# Patient Record
Sex: MALE
[De-identification: names, ages, dates, MRNs, and addresses within clinical notes are randomized per-mention and may not be internally consistent; named-entity substitution may affect disease eponyms.]

---

## 2019-01-19 ENCOUNTER — TRANSCRIPTION ENCOUNTER (OUTPATIENT)
Age: 44
End: 2019-01-19

## 2019-02-15 ENCOUNTER — TRANSCRIPTION ENCOUNTER (OUTPATIENT)
Age: 44
End: 2019-02-15

## 2019-07-07 ENCOUNTER — TRANSCRIPTION ENCOUNTER (OUTPATIENT)
Age: 44
End: 2019-07-07

## 2019-09-11 ENCOUNTER — OTHER (OUTPATIENT)
Age: 44
End: 2019-09-11

## 2019-09-11 ENCOUNTER — APPOINTMENT (OUTPATIENT)
Dept: ORTHOPEDIC SURGERY | Facility: CLINIC | Age: 44
End: 2019-09-11
Payer: MEDICAID

## 2019-09-11 VITALS
HEART RATE: 51 BPM | WEIGHT: 210 LBS | SYSTOLIC BLOOD PRESSURE: 136 MMHG | HEIGHT: 69 IN | DIASTOLIC BLOOD PRESSURE: 91 MMHG | BODY MASS INDEX: 31.1 KG/M2

## 2019-09-11 DIAGNOSIS — M54.41 LUMBAGO WITH SCIATICA, RIGHT SIDE: ICD-10-CM

## 2019-09-11 DIAGNOSIS — Z78.9 OTHER SPECIFIED HEALTH STATUS: ICD-10-CM

## 2019-09-11 PROCEDURE — 99204 OFFICE O/P NEW MOD 45 MIN: CPT

## 2019-09-11 PROCEDURE — 72100 X-RAY EXAM L-S SPINE 2/3 VWS: CPT

## 2019-09-11 PROCEDURE — 99203 OFFICE O/P NEW LOW 30 MIN: CPT

## 2019-09-11 RX ORDER — METHYLPREDNISOLONE 4 MG/1
4 TABLET ORAL
Qty: 1 | Refills: 0 | Status: ACTIVE | COMMUNITY
Start: 2019-09-11 | End: 1900-01-01

## 2019-09-11 NOTE — DISCUSSION/SUMMARY
[de-identified] : Continuation of Medrol Dosepak anti-inflammatories. Based upon his failed medication and significant complaints of sciatica patient is going to have a lumbar MRI obtained that further diagnostics prepared such as an epidural injection etc. His MRI seems amenable to injection therapy patient be referred to physiatry

## 2019-09-11 NOTE — PHYSICAL EXAM
[de-identified] : CONSTITUTIONAL: The patient is a very pleasant individual who is well-nourished and who appears stated age.\par PSYCHIATRIC: The patient is alert and oriented X 3 and in no apparent distress, and participates with orthopedic evaluation well.\par HEAD: Atraumatic and is nonsyndromic in appearance.\par EENT: No visible thyromegaly, EOMI.\par RESPIRATORY: Respiratory rate is regular, not dyspneic on examination.\par LYMPHATICS: There is no inguinal lymphadenopathy\par INTEGUMENTARY: Skin is clean, dry, and intact about the bilateral lower extremities and lumbar spine.\par VASCULAR: There is brisk capillary refill about the bilateral lower extremities.\par NEUROLOGIC: There are no pathologic reflexes. There is a decrease in sensation of the bilateral lower extremities on manual  examination. Deep tendon reflexes are well maintained at 2+/4 of the bilateral lower extremities and are symmetric..\par MUSCULOSKELETAL: There is no visible muscular atrophy. Manual motor strength is well maintained in the bilateral lower extremities. Range of motion of lumbar spine is well maintained but painful. The patient ambulates in a non-myelopathic manner. Negative tension sign and straight leg raise bilaterally. Quad extension, ankle dorsiflexion, EHL, plantar flexion, and ankle eversion are well preserved. Normal secondary orthopaedic exam of bilateral hips, greater trochanteric area, knees and ankles. There is significant pain with palpation and significant apprehensive range of motion.\par  [de-identified] : X-rays reviewed of the lumbar spine on today's date that shows age-appropriate lumbar degenerative disc disease

## 2019-09-11 NOTE — HISTORY OF PRESENT ILLNESS
[Worsening] : worsening [___ wks] : [unfilled] week(s) ago [10] : a current pain level of 10/10 [Lifting] : worsened by lifting [Bending] : worsened by bending [None] : No relieving factors are noted [de-identified] : Pleasant 44-year-old gentleman who presents in followup to an urgent care visit from last week. Patient states has a significant amount of low-back pain as well as right sciatica. Patient states medication so far been completely ineffective at controlling his pain and no discomfort he has failed anti-inflammatories muscle relaxants and a short course of steroids. ODILIA questionnaire is negative [Incontinence] : no incontinence [Ataxia] : no ataxia [Loss of Dexterity] : good dexterity [Urinary Ret.] : no urinary retention

## 2019-10-02 ENCOUNTER — APPOINTMENT (OUTPATIENT)
Dept: ORTHOPEDIC SURGERY | Facility: CLINIC | Age: 44
End: 2019-10-02
Payer: MEDICAID

## 2019-10-02 VITALS
BODY MASS INDEX: 31.83 KG/M2 | SYSTOLIC BLOOD PRESSURE: 129 MMHG | WEIGHT: 210 LBS | HEART RATE: 56 BPM | HEIGHT: 68 IN | DIASTOLIC BLOOD PRESSURE: 83 MMHG

## 2019-10-02 PROCEDURE — 99214 OFFICE O/P EST MOD 30 MIN: CPT

## 2019-10-02 NOTE — DISCUSSION/SUMMARY
[de-identified] : 45yo M with lumbar Degenerative disc disease and Annular tears. Pt was encouraged to start conservative management with physical therapy for core strengthening modalities, decreased pain modalities and medical massage. he was referred to physiatry for consideration of injection therapy.He was recommended continue with anti-inflammatories over-the-counter ibuprofen 3 tablets q.6 to 8 hours as needed for pain. He will followup as needed.

## 2019-10-02 NOTE — HISTORY OF PRESENT ILLNESS
[de-identified] : 43yo M presents for MRI review.  He reports back pain is still persistent across the lumbar spine the right lower extremity. He states Medrol Dosepak did not provide improvement of his pain he has not started physical therapy. He is currently not taking any anti-inflammatories he states it has not provided any improvement.    [Stable] : stable [Pain Location] : pain [10] : a current pain level of 10/10

## 2019-10-02 NOTE — PHYSICAL EXAM
[de-identified] : Spine: CONSTITUTIONAL: The patient is a very pleasant individual who is well-nourished and who appears stated age.\par \par PSYCHIATRIC: The patient is alert and oriented X 3 and in no apparent distress.\par HEENT: Atraumatic and is nonsyndromic in appearance.\par \par RESPIRATORY: Respiratory rate is regular, No BENNETT\par \par LYMPHATICS: There is no inguinal lymphadenopathy\par \par INTEGUMENTARY: Skin is clean, dry, and intact about the bilateral lower extremities and lumbar spine.\par \par VASCULAR: There is brisk capillary refill about the bilateral lower extremities and dorsalis pedis pulses are 2/4. \par \par NEUROLOGIC: There are no pathologic reflexes. There is no decrease in sensation of the bilateral lower extremities on Wartenberg pinwheel examination. Deep tendon reflexes are well maintained at 2+/4 of the bilateral lower extremities and are symmetric. \par \par MUSCULOSKELETAL: There is no visible muscular atrophy. Manual motor strength is well maintained in the bilateral lower extremities. Range of motion of lumbar spine is well maintained. The patient ambulates in a non-myelopathic manner. Negative tension sign and straight leg raise bilaterally. Quad extension, ankle dorsiflexion, EHL, plantar flexion, and ankle eversion are well preserved. Normal secondary orthopaedic exam of bilateral piriformis, hips, greater trochanters, knees. axial back pain reproducible with palpation.\par  [de-identified] : Lumbar MRi from [facility] radiology Straightening lumbar lordosis no acute fractures mild degenerative disc disease at L1-L2 and L4-L5 small broad-based central disc herniation with mild compression of the ankles mild narrowing of no foraminal bilaterally. L2-L3 level bowel broad-based disc bulge present resulting mild flattening of the thecal sac. L5-S1 level small to moderate broad-based central disc herniation which brought the thecal sac mild narrowing of lateral foraminal bilaterally.

## 2019-10-09 ENCOUNTER — APPOINTMENT (OUTPATIENT)
Dept: PHYSICAL MEDICINE AND REHAB | Facility: CLINIC | Age: 44
End: 2019-10-09
Payer: MEDICAID

## 2019-10-09 PROCEDURE — 20552 NJX 1/MLT TRIGGER POINT 1/2: CPT

## 2019-10-09 PROCEDURE — 76942 ECHO GUIDE FOR BIOPSY: CPT

## 2019-10-09 PROCEDURE — 99203 OFFICE O/P NEW LOW 30 MIN: CPT | Mod: 25

## 2019-10-09 NOTE — HISTORY OF PRESENT ILLNESS
[FreeTextEntry1] : 44 y.o. M referred to office for c/o LBP radiating down right leg x 4 weeks.  Pt. denies antecedent trauma or injury to L-spine.  Pain radiates down anterolateral right thigh past knee into medial calf.  Describes numbness in similar distribution.  No sxs in left leg but has left LBP.\par \par MRI L-spine at [facility].  Saw Dr. Booker who Rx'd Medrol dose pack - not very helpful.  No P.T. or injections.

## 2019-10-09 NOTE — PHYSICAL EXAM
[FreeTextEntry1] : ROM L-spine: 3/4 full flexion; very limited lumbar extension 2' pain (not down R leg)\par ROM Hips: smooth IR/ER\par Pelvic tilt: none\par Seated slump test: neg.\par SLR: neg.\par DTR's: symmetric knees/ankles\par MMT: 5/5 LE\par Sensation: decr PP L L4-S1 NRs\par Toe & Heel Walk: Yes.\par Palpation: Left lower lumbar paravertebral TPI.\par

## 2019-10-09 NOTE — PROCEDURE
[de-identified] : PROCEDURE NOTE: R/B/A to US - guided paravertebral block reviewed w/ patient.  Pt. is agreeable and wishes to proceed.  Signed consent form to be scanned into EMR.  Pt. placed in prone position.  L5 SP identified in short axis.  Transducer then moved over L L5 TP.  A 22-G, 3.5" needle was then advanced down to the base of the TP under direct US guidance.  This was followed by instillation of 3 cc 1% lidocaine + 40 mg Kenalog.  The patient tolerated the procedure well w/o adverse effects.  A band aid and ice pack was applied.  Post-injection instructions given.

## 2019-10-09 NOTE — REVIEW OF SYSTEMS
[Patient Intake Form Reviewed] : Patient intake form was reviewed [Negative] : Heme/Lymph [FreeTextEntry9] : L-spine

## 2019-10-09 NOTE — DATA REVIEWED
[MRI] : MRI [FreeTextEntry1] : MRI L-spine w/o (Sept. 2019): c/w advanced L5-S1 > L4-5 DDD.  Mild R L5-S1 NF narrowing.  L4-5 annular tear.

## 2021-01-13 ENCOUNTER — APPOINTMENT (OUTPATIENT)
Dept: PHYSICAL MEDICINE AND REHAB | Facility: CLINIC | Age: 46
End: 2021-01-13
Payer: MEDICAID

## 2021-01-13 VITALS
BODY MASS INDEX: 31.83 KG/M2 | HEART RATE: 60 BPM | SYSTOLIC BLOOD PRESSURE: 128 MMHG | DIASTOLIC BLOOD PRESSURE: 73 MMHG | HEIGHT: 68 IN | TEMPERATURE: 97 F | WEIGHT: 210 LBS

## 2021-01-13 PROCEDURE — 99213 OFFICE O/P EST LOW 20 MIN: CPT

## 2021-01-13 PROCEDURE — 99072 ADDL SUPL MATRL&STAF TM PHE: CPT

## 2021-01-13 RX ORDER — NAPROXEN 375 MG/1
375 TABLET ORAL
Qty: 30 | Refills: 2 | Status: ACTIVE | COMMUNITY
Start: 2021-01-13 | End: 1900-01-01

## 2021-01-13 NOTE — PHYSICAL EXAM
[FreeTextEntry1] : NAD\par A&Ox3\par Non-obese\par ROM L-spine: near full flexion; 10' lumbar extension 2' pain (not down R leg)\par ROM Hips: smooth IR/ER\par Pelvic tilt: none\par Seated slump test: neg.\par SLR: neg.\par DTR's: symmetric knees/ankles\par MMT: 5/5 LE\par Sensation: decr PP L L4-S1 NRs\par Toe & Heel Walk: Yes.\par Palpation: Left lower lumbar paravertebral TTP\par

## 2021-01-13 NOTE — HISTORY OF PRESENT ILLNESS
[FreeTextEntry1] : 45 y.o. M w/ h/o L5-S1 > L4-5 DDD returns to office for f/u.  Pt. states that he has been having "good" and "bad" days.  Pain has gotten worse over last several months, worse w/ bending, lifting and carrying.  Pt. describes intermittent, vague discomfort in legs (varies).  Not taking NSAIDs now.  No recent courses of P.T. or injections.

## 2021-01-13 NOTE — ASSESSMENT
[FreeTextEntry1] : 45 y.o. M w/ h/o chronic lower back pain likely 2' L5-S1 > L4-5 DDD.  Rx P.T. for modalities, gentle ROM, stretching and strengthening exercises.  Rx naproxen 375 mg; one tab po bid prn x 1-2 weeks.  No need for spinal injections at this time.   RTC 6-8 weeks or sooner should need arise.

## 2021-02-24 ENCOUNTER — APPOINTMENT (OUTPATIENT)
Dept: PHYSICAL MEDICINE AND REHAB | Facility: CLINIC | Age: 46
End: 2021-02-24

## 2021-10-14 ENCOUNTER — APPOINTMENT (OUTPATIENT)
Dept: PHYSICAL MEDICINE AND REHAB | Facility: CLINIC | Age: 46
End: 2021-10-14
Payer: MEDICAID

## 2021-10-14 VITALS
BODY MASS INDEX: 32.58 KG/M2 | HEART RATE: 53 BPM | WEIGHT: 220 LBS | HEIGHT: 69 IN | SYSTOLIC BLOOD PRESSURE: 132 MMHG | RESPIRATION RATE: 12 BRPM | DIASTOLIC BLOOD PRESSURE: 89 MMHG

## 2021-10-14 PROCEDURE — 99214 OFFICE O/P EST MOD 30 MIN: CPT

## 2021-10-14 RX ORDER — MELOXICAM 15 MG/1
15 TABLET ORAL DAILY
Qty: 1 | Refills: 1 | Status: ACTIVE | COMMUNITY
Start: 2019-10-09 | End: 1900-01-01

## 2021-10-14 NOTE — HISTORY OF PRESENT ILLNESS
[FreeTextEntry1] : 46 y.o. M w/ h/o chronic lower back pain likely 2' L5-S1 > L4-5 DDD returns to office for f/u.  Pt. states that he exacerbated his LBP approximately 2 weeks ago after picking up heavy box.  Pt. states that he went to [facility] ED twice this past week.  Pt. had CT L-spine at ED which I do not have access to.  Completed medrol dose pack.  Pt. endorses tingling down legs w/ episodes of prolonged sitting.   No recent P.T.  No spinal injections.  He has been out of work for 1.5 weeks.

## 2021-10-14 NOTE — ASSESSMENT
[FreeTextEntry1] : 46 y.o. M w/ exacerbation of chronic lower back pain likely 2' L5-S1 > L4-5 DDD.  I spent most of today's office visit (25 min) discussing pathogenesis and further non-operative management.  Discussed R/B/A to meloxicam 15 mg; one tab po qd x 1-2 weeks as tolerated.  Explained that oral steroids are not indicated for axial LBP.   No role for spinal injections at this time.  Gave new Rx P.T. for modalities, gentle ROM, stretching and strengthening exercises.   Reviewed proper bending, lifting and carrying techniques.  Pt. is in agreement with plan.  All questions answered.  RTC 6-8 weeks or sooner should need arise.

## 2021-10-14 NOTE — PHYSICAL EXAM
[FreeTextEntry1] : NAD\par A&Ox3\par Non-obese\par ROM L-spine: 1/4 full flexion before pain (new); 5-10' lumbar extension 2' pain  \par ROM Hips: smooth IR/ER\par Pelvic tilt: none\par Seated slump test: neg.\par SLR: neg.\par DTR's: symmetric knees/ankles\par MMT: 5/5 LE\par Sensation: SILT\par Toe & Heel Walk: Yes.\par Palpation: B/L lower lumbar paravertebral TTP\par

## 2021-11-30 ENCOUNTER — APPOINTMENT (OUTPATIENT)
Dept: PHYSICAL MEDICINE AND REHAB | Facility: CLINIC | Age: 46
End: 2021-11-30

## 2024-04-11 ENCOUNTER — APPOINTMENT (OUTPATIENT)
Dept: ORTHOPEDIC SURGERY | Facility: CLINIC | Age: 49
End: 2024-04-11
Payer: MEDICAID

## 2024-04-11 VITALS
DIASTOLIC BLOOD PRESSURE: 82 MMHG | WEIGHT: 215 LBS | HEART RATE: 59 BPM | HEIGHT: 69 IN | SYSTOLIC BLOOD PRESSURE: 134 MMHG | BODY MASS INDEX: 31.84 KG/M2

## 2024-04-11 DIAGNOSIS — M60.9 MYOSITIS, UNSPECIFIED: ICD-10-CM

## 2024-04-11 DIAGNOSIS — M51.36 OTHER INTERVERTEBRAL DISC DEGENERATION, LUMBAR REGION: ICD-10-CM

## 2024-04-11 PROCEDURE — 20552 NJX 1/MLT TRIGGER POINT 1/2: CPT

## 2024-04-11 PROCEDURE — 99204 OFFICE O/P NEW MOD 45 MIN: CPT | Mod: 25

## 2024-04-11 PROCEDURE — 72100 X-RAY EXAM L-S SPINE 2/3 VWS: CPT

## 2024-04-11 RX ORDER — METHOCARBAMOL 500 MG/1
500 TABLET, FILM COATED ORAL
Qty: 30 | Refills: 1 | Status: ACTIVE | COMMUNITY
Start: 2024-04-11 | End: 1900-01-01

## 2024-04-11 RX ORDER — KETOROLAC TROMETHAMINE 10 MG/1
10 TABLET, FILM COATED ORAL EVERY 6 HOURS
Qty: 20 | Refills: 0 | Status: COMPLETED | COMMUNITY
Start: 2024-04-11 | End: 2024-04-16

## 2024-04-11 NOTE — HISTORY OF PRESENT ILLNESS
[de-identified] : 49-year-old male has been having severe 10 out of 10 back pain since April 6, 2024.  His pain is so significant he went to the emergency room at [facility] they gave him Medrol Dosepak, pain medication, anti-inflammatories, and did a CT scan which revealed degenerative disc disease diffusely.  He states pain continues and is no better than it was when he went to the emergency department.  He has been doing home exercises particularly for back and leg pain which she learned in the past from courses of physical therapy and unfortunately pain is no better.  Pain occurs with changing position sit to stand getting out of bed.  Pain is decreasing his ability to accomplish hygiene, showering, getting dressed in the morning and he has not been able to go to work as a  for the last week due to this severe pain.  He has had intermittent low back pain in the past but is never gone down his legs which intermittently is occurring he is very concerned with this.  He is not having no change in bowel or bladder.  He states that in the past when this pain would occur trigger point injection would help him.  Past medical surgical social family allergy history is reviewed.

## 2024-04-11 NOTE — PHYSICAL EXAM
[de-identified] : CONSTITUTIONAL: The patient is a very pleasant individual who is well-nourished and who appears stated age.  He is accompanied by his wife. PSYCHIATRIC: The patient is alert and oriented X 3 and in no apparent distress, and participates with orthopedic evaluation well. HEAD: Atraumatic and is nonsyndromic in appearance. EENT: No visible thyromegaly, EOMI. RESPIRATORY: Respiratory rate is regular, not dyspneic on examination. LYMPHATICS: There is no inguinal lymphadenopathy INTEGUMENTARY: Skin is clean, dry, and intact about the bilateral lower extremities and lumbar spine. VASCULAR: There is brisk capillary refill about the bilateral lower extremities. NEUROLOGIC: There are no pathologic reflexes. There is no decrease in sensation of the bilateral lower extremities on manual  examination. Deep tendon reflexes are well maintained at 2+/4 of the bilateral lower extremities and are symmetric.. MUSCULOSKELETAL: There is no visible muscular atrophy. Manual motor strength is well maintained in the bilateral lower extremities. Range of motion of lumbar spine is apprehensive, there is pain across the low back with extension and flexion. The patient ambulates in a non-myelopathic manner but with an antalgic gait.  Positive bilateral tension sign and negative straight leg raise bilaterally. Quad extension, ankle dorsiflexion, EHL, plantar flexion, and ankle eversion are well preserved, however there is prehension on bilateral hip flexion, pain radiating to the back. Normal secondary orthopaedic exam of bilateral hips, greater trochanteric area, knees and ankles [de-identified] : X-rays of the lumbar spine have been reviewed on today's date showing isolated L4-5 L5-S1 lumbar degenerative disc disease no acute osseous abnormalities.  Today's date April 11, 2024 2 views ordered and reviewed.  These are directly compared to previous lumbar x-rays that were taken in 2019 that shows mild age-appropriate progression of L4 551 lumbar spondylosis  Patient had a CT scan at [facility], report but not actual images are here for review, report states degenerative disc disease particularly at L4-L5 L5-S1 as well as  L2-L3, no acute finding such as fracture or growth/tumor.

## 2024-04-11 NOTE — DISCUSSION/SUMMARY
[Medication Risks Reviewed] : Medication risks reviewed [2 Weeks] : in 2 weeks [de-identified] : 45 minutes was spent reviewing the x-rays as well as discussing with the patient their clinical presentation, diagnosis and providing education.  Conservative treatment was discussed with the patient at length. Anticipatory guidance regarding disease process lumbar degenerative disc disease, avoidance of acute exacerbation this was discussed at length and all patients commenting concerns were answered to the patient's satisfaction. Physical therapy for decrease pain and increase function was ordered. Patient was given home exercises as approved by [employer] and works well held directed toward this particular process. Intermittent use of acetaminophen 500 mg 2 tablets t.i.d. p.r.n. mild to moderate pain, he will finish the Medrol Dosepak given to him at the ED emergency department at [facility] and will then start ketorolac 10 mg p.o. 4 times daily for 5 days.  After that time he can take ibuprofen 800 mg 3 times daily as needed pain.  Methocarbamol 500 mg p.o. 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  Home exercise including stretching on a daily basis for 20-30 minutes was recommended. Heat, ice, topical were discussed as needed.  MRI of the lumbar spine is ordered is medically necessary for failure of conservative treatment as per HPI as well as pain levels 10 out of 10 inability to accomplish activities of daily living and will guide treatment plan pain management injections versus surgical option.  He will follow-up in 2 weeks for repeat clinical exam at which point in time we will go over MRI results and further treatment plan.

## 2024-04-11 NOTE — PROCEDURE
[de-identified] : Verbal consent was obtained and all questions, comments and concerns were addressed.  After trigger point in the affected area into superficial muscular trigger point right lower lumbar paravertebral muscle was cleansed with alcohol prep X 3, ethyl chloride was used as local anesthetic.  Under sterile precautions 1cc of 1 percent lidocaine without epinephrine, plus 10 mg depomedrol, 30 mg ketorolac were instilled into the affected trigger points.  Patient tolerated procedure well. Dry sterile dressing was placed and patient described relief of pain 5 minutes after procedure was performed.

## 2024-04-17 DIAGNOSIS — Z12.11 ENCOUNTER FOR SCREENING FOR MALIGNANT NEOPLASM OF COLON: ICD-10-CM
